# Patient Record
Sex: FEMALE | Race: WHITE
[De-identification: names, ages, dates, MRNs, and addresses within clinical notes are randomized per-mention and may not be internally consistent; named-entity substitution may affect disease eponyms.]

---

## 2018-04-16 ENCOUNTER — HOSPITAL ENCOUNTER (INPATIENT)
Dept: HOSPITAL 68 - SDA | Age: 78
LOS: 3 days | Discharge: NURSING, REHAB OR LONG-TERM CARE | DRG: 470 | End: 2018-04-19
Attending: ORTHOPAEDIC SURGERY | Admitting: ORTHOPAEDIC SURGERY
Payer: COMMERCIAL

## 2018-04-16 VITALS — SYSTOLIC BLOOD PRESSURE: 104 MMHG | DIASTOLIC BLOOD PRESSURE: 52 MMHG

## 2018-04-16 VITALS — SYSTOLIC BLOOD PRESSURE: 115 MMHG | DIASTOLIC BLOOD PRESSURE: 78 MMHG

## 2018-04-16 VITALS — DIASTOLIC BLOOD PRESSURE: 68 MMHG | SYSTOLIC BLOOD PRESSURE: 120 MMHG

## 2018-04-16 VITALS — SYSTOLIC BLOOD PRESSURE: 110 MMHG | DIASTOLIC BLOOD PRESSURE: 68 MMHG

## 2018-04-16 VITALS — DIASTOLIC BLOOD PRESSURE: 68 MMHG | SYSTOLIC BLOOD PRESSURE: 117 MMHG

## 2018-04-16 VITALS — BODY MASS INDEX: 34.81 KG/M2 | WEIGHT: 184.37 LBS | HEIGHT: 61 IN

## 2018-04-16 DIAGNOSIS — E66.9: ICD-10-CM

## 2018-04-16 DIAGNOSIS — M17.11: Primary | ICD-10-CM

## 2018-04-16 DIAGNOSIS — Z91.040: ICD-10-CM

## 2018-04-16 DIAGNOSIS — E78.5: ICD-10-CM

## 2018-04-16 DIAGNOSIS — E03.9: ICD-10-CM

## 2018-04-16 DIAGNOSIS — M81.0: ICD-10-CM

## 2018-04-16 DIAGNOSIS — Z85.3: ICD-10-CM

## 2018-04-16 PROCEDURE — 3E0T3BZ INTRODUCTION OF ANESTHETIC AGENT INTO PERIPHERAL NERVES AND PLEXI, PERCUTANEOUS APPROACH: ICD-10-PCS | Performed by: ORTHOPAEDIC SURGERY

## 2018-04-16 PROCEDURE — 2NBSP: CPT

## 2018-04-16 PROCEDURE — C1713 ANCHOR/SCREW BN/BN,TIS/BN: HCPCS

## 2018-04-16 PROCEDURE — 0SRC0J9 REPLACEMENT OF RIGHT KNEE JOINT WITH SYNTHETIC SUBSTITUTE, CEMENTED, OPEN APPROACH: ICD-10-PCS | Performed by: ORTHOPAEDIC SURGERY

## 2018-04-16 NOTE — SURGICAL DISCHARGE SUMMARY
Visit Information
 
Visit Dates
Admission Date:
04/16/18
 
Discharge Date:
4/19/18
 
 
 
History of Present Illness
Chief Complaint:
Right knee pain related to osteoarthritis
 
Medical History
Blood Transfusion Hx: No
Cardiovascular: hyperlipidemia
Endocrine: hypothyroidism
Cancer(s): breast cancer
History of MRSA: No
History of VRE: No
History of CDIFF: No
Isolation History: Standard
 
Surgical History
Pertinent Surgical History: sp lumpectomy
 
Psychosocial History
Where Do You Live? Home
Who Do You Live With? Patient/Self
Services at Home: None
What is Your Primary Language? English
Review of Systems:
See H&P
 
Hospital Course
 
Course
Attending Physician:
Kandice OSUNA,[facility]
 
Primary Care Physician:
Michael OSUNA,[facility]
 
Hospital Course:
Patient was admitted to the hospital for an elective total joint replacement.  
Procedure was tolerated well and patient was transferred to a general surgical 
floor.  Diet was advanced and tolerated.  Physical therapy performed evaluation 
and treatment.  At time of hospital discharge, vital signs were stable, 
neurovascular status was intact, and pain was controlled with the use of oral 
pain medications. Pt DCed to STR with instructions to FU with Dr Woodson in 2
weeks.
 
Allergies:
Coded Allergies:
adhesive (BLISTER,  04/16/18)
  adhesive that contains latex
 
 
Disposition Summary
 
Disposition
Principal Diagnosis:
Primary osteoarthritis right knee
Additional Diagnosis:
Same, status post right total knee replacement
Discharge Disposition: SNF
 
Discharge Instructions
 
General Discharge Information
Code Status: Full Code
Patient's Diet:
Regular healthy diet
Patient's Activity:
WBAT
Follow-Up Instructions/Appts:
Follow-up in MDs office 2 weeks from surgery.  Call to confirm/schedule 
appointment
 
Medications at Discharge
Discharge Medications:
Continue taking these medications:
Calcium Carbonate/Vitamin D3 (Calcium 500 + D Tablet) 500 MG-400 TABLET
    1 Tablet ORAL TWICE DAILY
 
Gluc/Javi-MSM#1/Vit C/Pb/Bor (Glucosa-Chond-MSM Complex Cplt) 375-500 MG 
TABLET
    
 
Ubidecarenone (Co Q-10) 400 MG CAPSULE
    
 
Letrozole (Femara) 2.5 MG TABLET
    1 Tablet ORAL DAILY
 
Levothyroxine Sodium (Synthroid) 25 MCG TABLET
    1 Tablet ORAL DAILY
 
Psyllium Hydrophylic Muciloid (Metamucil Packet) 3.4 GRAM POWD.PACK
    
 
Simvastatin (Zocor*) 20 MG TABLET
    
 
Start taking the following new medications:
Oxycodone HCl/Acetaminophen (Percocet 5-325 MG Tablet) 5 MG-325 MG TABLET
    1-2 Tablet ORAL EVERY 4-6 HOURS as needed for postop pain
    Qty = 30
    No Refills
 
Celecoxib (Celebrex) 200 MG CAPSULE
    2 Capsule ORAL DAILY
    Qty = 60
    No Refills
 
Apixaban (Eliquis) 2.5 MG TABLET
    1 Tablet ORAL TWICE DAILY
    Qty = 84
    No Refills

## 2018-04-16 NOTE — PN- ORTHOPEDIC
Subjective
Subjective:
POST-OP NOTE:
 
No complaints. Reports pain control significantly better after block done in 
pacu. Currently tolerating clears. No nausea. Eager to try food. Not yet out of 
bed. No dizziness. No shortness of breath. No chest pains.
 
Objective
Vital Signs and I&Os
Vital Signs
 
 
Date Time Temp Pulse Resp B/P B/P Pulse O2 O2 Flow FiO2
 
     Mean Ox Delivery Rate 
 
04/16 1420      99 Nasal 2.0L 
 
       Cannula  
 
04/16 1420 97.9 64 20 120/68  99 Nasal 2.0L 
 
       Cannula  
 
 
 Intake & Output
 
 
 04/16 1600 04/16 0800 04/16 0000 04/15 1600 04/15 0800 04/15 0000
 
Intake Total      
 
Output Total      
 
Balance      
 
       
 
Patient 184 lb     
 
Weight      
 
Weight Bed scale     
 
Measurement      
 
Method      
 
 
 
Physical Exam:
General - alert & oriented x 3. comfortable. no acute distress.
Lungs - clear bilaterally. no w/r/r.
Cardiac - s1s2
Abdomen - soft. nontender.
 - hope draining clear, yellow urine.
Extremities - warm bilaterally. no c/c/e. calves soft and nontender b/l. right 
leg dressing c/d/i. ice pack in place over right knee. on-q in place. nvi. 
athrombics active b/l.
Current Medications:
 Current Medications
 
 
  Sig/Franchesca Start time  Last
 
Medication Dose Route Stop Time Status Admin
 
Acetaminophen 650 MG ONCE 04/16 0000 DC 
 
  PO 04/16 2359  
 
Al Hydroxide/Mg  30 ML Q6P PRN 04/16 1445 AC 
 
Hydroxide  PO   
 
Apixaban 2.5 MG BID 04/17 0900 AC 
 
  PO   
 
Atorvastatin Calcium 10 MG 1700 04/16 1700 AC 
 
  PO   
 
Celecoxib 400 MG DAILY 04/17 0900 AC 
 
  PO   
 
Celecoxib 400 MG ONCE 04/16 0000 DC 
 
  PO 04/16 2359  
 
Dexamethasone 10 MG ONCE 04/16 0000 DC 
 
  IV 04/16 2359  
 
Dextrose/Sodium  1,000 ML Q13H 04/16 1445 AC 
 
Chloride  IV   
 
Docusate Sodium 100 MG DAILY AS NEEDED PRN 04/16 1445 AC 
 
  PO   
 
Gabapentin 300 MG ONCE 04/16 0000 DC 
 
  PO 04/16 2359  
 
Letrozole 2.5 MG DAILY 04/17 0900 AC 
 
  PO   
 
Levothyroxine Sodium 0.025 MG DAILY AC 04/17 0700 AC 
 
  PO   
 
Morphine Sulfate 2 MG Q3P PRN 04/16 1445 AC 
 
  IV   
 
Morphine Sulfate 4 MG Q3P PRN 04/16 1445 AC 
 
  IV   
 
Ondansetron HCl 4 MG Q6P PRN 04/16 1445 AC 
 
  IV   
 
Oxycodone HCl 10 MG ONCE 04/16 0000 DC 
 
  PO 04/16 2359  
 
Oxycodone/ 1 TAB Q4P PRN 04/16 1445 AC 
 
Acetaminophen  PO   
 
Oxycodone/ 2 TAB Q4P PRN 04/16 1445 AC 
 
Acetaminophen  PO   
 
Polyethylene Glycol 17 GM DAILY AS NEEDED PRN 04/16 1445 AC 
 
  PO   
 
Ropivacaine 500 ML ONCE ONE 04/16 1015 DC 
 
ON-Q Ball 1 BAG INJ 04/16 1016  
 
Scopolamine HBr 1 PAT ONCE 04/16 0000 DC 
 
  TOP 04/16 2359  
 
Senna/Docusate Sodium 2 TAB AT BEDTIME AS NEED.. 04/16 1445 AC 
 
  PO   
 
Vancomycin HCl 1,000 MG ONCE ONE 04/16 2200 AC 
 
Dextrose/Water 250 ML IV 04/16 2259  
 
Vancomycin HCl 1,000 MG ONCE 04/16 0000 DC 
 
Dextrose/Water 250 ML IV 04/16 2359  
 
 
 
 
Assessment/Plan
Assessment/Plan
This 77 year old female with hx breast cancer, hypothryoidism, hyperlipidemia, 
constipation, is POD#0 s/p right total knee replacement for primary 
osteoarthritis
 
advance diet as tolerated
pain control as ordered
colace bid / miralax prn
eliquis bid - dvt ppx
celebrex
laura-op vanco
PT eval
d/c hope in am
f/u am labs
home meds ordered
dressing change and removal of On-q POD#2
STR planning, [facility] is her goal
will d/w 
 
 
Core Measures
 
Venous Thromboembolism
VTE Risk Factors Surgery
No Mechanical VTE Prophylaxis d/t N/A MechProphylax Ordered
No VTE Pharm Prophylaxis d/t NA PharmProphylax ordered

## 2018-04-16 NOTE — PATIENT DISCHARGE INSTRUCTIONS
Discharge Instructions
 
General Discharge Information
You were seen/treated for:
Primary osteoarthritis right knee
You had these procedures:
Right total knee replacement
Watch for these problems:
Increasing pain despite the use of pain medication
Increasing redness, warmth or swelling
Drainage of any type from incision
Inability to bear weight on operative leg
Persistent nausea and vomiting
Fever greater than 101.5 degrees
 
Other wound care:
Please keep wound clean and dry.  
No ointments or lotions of any type on or near incision.
Your dressing will be changed by your nurse on the second day after your 
surgery.  Daily dry dressing changes are recommended each day thereafter.  
You may shower 48hr after surgery.  Do not soak your wound- no tub baths or 
swimming. 
 
 
Diet
Continue normal diet: Yes
 
Activity
Activity Limited to: Weight bear as tolerated
Additional ACTIVITY Info:
Use assistive devices as needed
 
Acute Coronary Syndrome
 
Inclusion Criteria
At DC or during hospital stay patient has or had the following:
ACS DIAGNOSIS No
 
Discharge Core Measures
Meds if any: Prescribed or Continued at Discharge
Meds if any: NOT Prescribed or Continued at Discharge
 
Congestive Heart Failure
 
Inclusion Criteria
At DC or during hospital stay patient has or had the following:
CHF DIAGNOSIS No
 
Discharge Core Measures
Meds if any: Prescribed or Continued at Discharge
Meds if any: NOT Prescribed or Continued at Discharge
 
Cerebrovascular accident
 
Inclusion Criteria
At DC or during hospital stay patient has or had the following:
CVA/TIA Diagnosis No
 
Discharge Core Measures
Meds if any: Prescribed or Continued at Discharge
Meds if any: NOT Prescribed or Continued at Discharge
 
Venous thromboembolism
 
Inclusion Criteria
VTE Diagnosis No
VTE Type NONE
VTE Confirmed by (Test) NONE
 
Discharge Core Measures
- Per Current guidelines, there needs to be overlap
- treatment for the first 5 days of Warfarin therapy.
- If discharged on Warfarin prior to 5 days of
- overlap therapy, the patient will need to be
- assessed for post discharge needs including
- *Post discharge parental anticoagulation
- *Warfarin and/or parental anticoagulation education
- *Follow up date to check INR post discharge
At least 5 days overlap therapy as Inpatient No
Meds if any: Prescribed or Continued at Discharge
Note: Overlap Therapy is Warfarin and Anticoagulant
Meds if any: NOT Prescribed or Continued at Discharge

## 2018-04-16 NOTE — ADMISSION CORE MEASURES
Acute Coronary Syndrome (CM)
 
ACS Core Measures
Acute Coronary Syndrome Diagnosis No
 
Congestive Heart Failure (NEW)
 
CHF Core Measures
Congestive Heart Failure Diagnosis No
 
Cerebrovascular Accident (NEW)
 
CVA Core Measures
CVA/TIA Diagnosis No
 
Venous Thromboembolism AUG17
 
VTE Core Frankie (View Protocol)
VTE Risk Factors Surgery
No Mechanical VTE Prophylaxis d/t N/A MechProphylax Ordered
No VTE Pharm Prophylaxis d/t NA PharmProphylax ordered
 
Problem List
 
As ranked by this Provider
includes Assessment & Plan
 1. Primary osteoarthritis of right knee
 
 
HOME MEDS
Home Med List
Calcium Carbonate/Vitamin D3 (Calcium 500 + D Tablet) 500 MG-400 TABLET   1 TAB 
PO BID SUPPLEMENT  (Reported)
Letrozole (Femara) 2.5 MG TABLET   1 TAB PO DAILY BREAST CANCER  (Reported)
Levothyroxine Sodium (Synthroid) 25 MCG TABLET   1 TAB PO DAILY HORMONE  (
Reported)

## 2018-04-17 VITALS — SYSTOLIC BLOOD PRESSURE: 118 MMHG | DIASTOLIC BLOOD PRESSURE: 72 MMHG

## 2018-04-17 VITALS — SYSTOLIC BLOOD PRESSURE: 101 MMHG | DIASTOLIC BLOOD PRESSURE: 65 MMHG

## 2018-04-17 VITALS — SYSTOLIC BLOOD PRESSURE: 110 MMHG | DIASTOLIC BLOOD PRESSURE: 70 MMHG

## 2018-04-17 VITALS — DIASTOLIC BLOOD PRESSURE: 60 MMHG | SYSTOLIC BLOOD PRESSURE: 96 MMHG

## 2018-04-17 VITALS — DIASTOLIC BLOOD PRESSURE: 76 MMHG | SYSTOLIC BLOOD PRESSURE: 124 MMHG

## 2018-04-17 VITALS — SYSTOLIC BLOOD PRESSURE: 108 MMHG | DIASTOLIC BLOOD PRESSURE: 64 MMHG

## 2018-04-17 LAB
ABSOLUTE GRANULOCYTE CT: 11 /CUMM (ref 1.4–6.5)
BASOPHILS # BLD: 0 /CUMM (ref 0–0.2)
BASOPHILS NFR BLD: 0 % (ref 0–2)
EOSINOPHIL # BLD: 0 /CUMM (ref 0–0.7)
EOSINOPHIL NFR BLD: 0 % (ref 0–5)
ERYTHROCYTE [DISTWIDTH] IN BLOOD BY AUTOMATED COUNT: 15 % (ref 11.5–14.5)
GRANULOCYTES NFR BLD: 85 % (ref 42.2–75.2)
HCT VFR BLD CALC: 31.3 % (ref 37–47)
LYMPHOCYTES # BLD: 1 /CUMM (ref 1.2–3.4)
MCH RBC QN AUTO: 30.1 PG (ref 27–31)
MCHC RBC AUTO-ENTMCNC: 33.7 G/DL (ref 33–37)
MCV RBC AUTO: 89.5 FL (ref 81–99)
MONOCYTES # BLD: 0.9 /CUMM (ref 0.1–0.6)
PLATELET # BLD: 161 /CUMM (ref 130–400)
PMV BLD AUTO: 9.2 FL (ref 7.4–10.4)
RED BLOOD CELL CT: 3.5 /CUMM (ref 4.2–5.4)
WBC # BLD AUTO: 13 /CUMM (ref 4.8–10.8)

## 2018-04-17 NOTE — PN- ORTHOPEDIC
**See Addendum**
Subjective
Subjective:
Reports did not sleep well. She may be interested in trying melatonin or 
benadryl tonight if offered. Reports pain relatively controlled. Not yet up with
PT today. She denies dizziness. No shortness of breath. No chest pains. 
Tolerating diet. Anticipates discharge to rehab on thursday.
 
Objective
Vital Signs and I&Os
Vital Signs
 
 
Date Time Temp Pulse Resp B/P B/P Pulse O2 O2 Flow FiO2
 
     Mean Ox Delivery Rate 
 
04/17 0802 97.7 57 20 118/72  94 Room Air  
 
04/17 0410 97.5 65 18 96/60  94 Room Air  
 
04/17 0015 97.8 67 18 101/65  95 Room Air  
 
04/16 2005 97.8 63 18 117/68  99 Room Air  
 
04/16 1830 97.4 64 18 110/68  98 Room Air  
 
04/16 1620 97.4 54 18 115/78  91 Room Air  
 
04/16 1420      99 Nasal 2.0L 
 
       Cannula  
 
04/16 1420 97.9 64 20 120/68  99 Nasal 2.0L 
 
       Cannula  
 
 
 Intake & Output
 
 
 04/17 1600 04/17 0800 04/17 0000 04/16 1600 04/16 0800 04/16 0000
 
Intake Total  1080 1125   
 
Output Total  1600 1500   
 
Balance  -520 -375   
 
       
 
Intake, IV  600    
 
Intake, Oral  480 1125   
 
Output, Urine  1600 1500   
 
Patient    184 lb  
 
Weight      
 
Weight    Bed scale  
 
Measurement      
 
Method      
 
 
 
Physical Exam:
General - alert & oriented x 3. comfortable. no acute distress.
Lungs - clear bilaterally. no w/r/r.
Cardiac - s1s2. 
Abdomen - soft. nontender.
 - hope draining clear, yellow urine. right leg dressing c/d/i. on q in 
place. athrombics active b/l. no calf tenderness bilaterally. nvi.
Current Medications:
 Current Medications
 
 
  Sig/Franchesca Start time  Last
 
Medication Dose Route Stop Time Status Admin
 
Acetaminophen 650 MG ONCE 04/16 0000 DC 
 
  PO 04/16 2359  
 
Al Hydroxide/Mg  30 ML Q6P PRN 04/16 1445 AC 
 
Hydroxide  PO   
 
Apixaban 2.5 MG BID 04/17 0900 AC 
 
  PO   
 
Atorvastatin Calcium 10 MG 1700 04/16 1700 AC 04/16
 
  PO   1635
 
Celecoxib 400 MG DAILY 04/17 0900 AC 
 
  PO   
 
Celecoxib 400 MG ONCE 04/16 0000 DC 
 
  PO 04/16 2359  
 
Dexamethasone 10 MG ONCE 04/16 0000 DC 
 
  IV 04/16 2359  
 
Dextrose/Sodium  1,000 ML Q13H 04/16 1445 AC 04/17
 
Chloride  IV   0201
 
Docusate Sodium 100 MG BID 04/16 2100 AC 04/16
 
  PO   2157
 
Docusate Sodium 100 MG DAILY AS NEEDED PRN 04/16 1445 DC 
 
  PO   
 
Fentanyl Citrate 250 MCG .STK-MED ONE 04/16 1057 DC 
 
  IM 04/16 1058  
 
Fentanyl Citrate 100 MCG .STK-MED ONE 04/16 0929 DC 
 
  IM 04/16 0930  
 
Gabapentin 300 MG ONCE 04/16 0000 DC 
 
  PO 04/16 2359  
 
Letrozole 2.5 MG DAILY 04/17 0900 AC 
 
  PO   
 
Levothyroxine Sodium 0.025 MG DAILY AC 04/17 0700 AC 04/17
 
  PO   0519
 
Midazolam HCl 4 MG .STK-MED ONE 04/16 0929 DC 
 
  IM 04/16 0930  
 
Morphine Sulfate 2 MG Q3P PRN 04/16 1445 AC 
 
  IV   
 
Morphine Sulfate 4 MG Q3P PRN 04/16 1445 AC 
 
  IV   
 
Morphine Sulfate 10 MG .STK-MED ONE 04/16 0930 DC 
 
  IV 04/16 0931  
 
Ondansetron HCl 4 MG Q6P PRN 04/16 1445 AC 
 
  IV   
 
Oxycodone HCl 10 MG ONCE 04/16 0000 DC 
 
  PO 04/16 2359  
 
Oxycodone/ 1 TAB Q4P PRN 04/16 1445 AC 
 
Acetaminophen  PO   
 
Oxycodone/ 2 TAB Q4P PRN 04/16 1445 AC 
 
Acetaminophen  PO   
 
Polyethylene Glycol 17 GM DAILY AS NEEDED PRN 04/16 1445 AC 
 
  PO   
 
Ropivacaine 500 ML ONCE ONE 04/16 1015 DC 
 
ON-Q Ball 1 BAG INJ 04/16 1016  
 
Scopolamine HBr 1 PAT ONCE 04/16 0000 DC 
 
  TOP 04/16 2359  
 
Senna/Docusate Sodium 2 TAB AT BEDTIME AS NEED.. 04/16 1445 AC 
 
  PO   
 
Tranexamic Acid 2,000 MG .STK-MED ONE 04/16 0929 DC 
 
  IV 04/16 0930  
 
Vancomycin HCl 1,000 MG ONCE ONE 04/16 2200 DC 04/16
 
Dextrose/Water 250 ML IV 04/16 2259  2157
 
Vancomycin HCl 1,000 MG ONCE 04/16 0000 DC 
 
Dextrose/Water 250 ML IV 04/16 2359  
 
 
 
 
Results
Last 48 Hours of Labs:
 Laboratory Tests
 
 
 04/17
 
 0750
 
Chemistry 
 
  Sodium Pending
 
  Potassium Pending
 
  Chloride Pending
 
  Carbon Dioxide Pending
 
  Anion Gap Pending
 
  BUN Pending
 
  Creatinine Pending
 
  BUN/Creatinine Ratio Pending
 
Hematology 
 
  CBC w Diff Pending
 
  WBC Pending
 
  RBC Pending
 
  Hgb Pending
 
  Hct Pending
 
  MCV Pending
 
  MCH Pending
 
  MCHC Pending
 
  RDW Pending
 
  Plt Count Pending
 
  MPV Pending
 
 
 
 
Assessment/Plan
Assessment/Plan
This 77 year old female with hx breast cancer, hypothryoidism, hyperlipidemia, 
constipation, is POD#1 s/p right total knee replacement for primary 
osteoarthritis
 
tolerating diet. d/c iv fluids
pain controlled
colace bid / miralax prn
eliquis bid - dvt ppx
celebrex
laura-op vanco complete
PT eval
d/c hope
f/u labs
home meds ordered
dressing change and removal of On-q POD#2
STR planning, [facility] is her goal
will d/w 
 
 
 
Core Measures
 
Venous Thromboembolism
VTE Risk Factors Surgery
No Mechanical VTE Prophylaxis d/t N/A MechProphylax Ordered
No VTE Pharm Prophylaxis d/t NA PharmProphylax ordered

## 2018-04-17 NOTE — OPERATIVE REPORT
Operative/Inv Procedure Report
Surgery Date: 04/16/18
Name of Procedure:
Right total knee arthroplasty
Pre-Operative Diagnosis:
Primary right knee osteoarthritis
Post-Operative Diagnosis:
Same
Estimated Blood Loss: less than 50ml
Surgeon/Assistant:
Kandice OSUNA,Jarvis Almazan
 
Anesthesia: general endotracheal tube
Implants:
Norman triathlon total knee system-size 3 femur, size 3 tibia, 9 mm cruciate 
retaining polyethylene, 27 patella
Drains:
None
Specimens:
Femoral, tibial, patellar bone, meniscal remnants
Microbiology:
Urine
Tourniquet:
56 minutes
Complications:
None
Condition:
Stable
Operative Indication:
Patient is a 77-year-old woman who has a long history of bilateral knee pain.  
Symptoms are aggravated with activity and activity of daily living compromised 
by her right knee pain.  She underwent previous treatment for knee with only 
short-term relief.  She was evaluated in our office and wanted to proceed with 
total knee arthroplasty.  Risks, benefits and expectations were discussed which 
included but were not limited to persistent knee pain, need for subsequent 
surgery, infection, stiffness,  injury to blood vessel or nerve, DVT anesthesia 
risks.  She wished to proceed with total knee arthroplasty.  Preoperatively 
patient had a 15 flexion contracture and this would need to be addressed during
the case
 
Operative/Procedure Note
Note:
Patient was brought to the operating room and transferred to the operating 
table.  Once under appropriate anesthesia the right lower extremity was prepped 
and draped in standard fashion.  There was an attempt to have a spinal 
anesthesia done but it was not successful and therefore patient went under 
general anesthesia.  Right lower extremity was elevated exsanguinated and 
tourniquet was inflated at 300 mL was a pressure.  A standard anterior shins was
made for anticipated medial parapatellar approach to the knee.  The incision was
taken down sharply to the underlying retinaculum.  A medial retinacular approach
was minimal extension into the quadriceps tendon was done.  Moderate knee 
effusion was evacuated.  Remnants of the anterior and medial and lateral 
meniscal tissues were excised.  Remnants of the ACL was excised.  Osteophytes 
were removed.  The knee was flexed and the patella was subluxed exposing the 
distal femur.  A drill was used for the intramedullary guide for the distal 
femoral cut.  Because of her valgus alignment and flexion contracture, 5 cut 
was chosen with a 10 mm thickness of the distal femur removed.  The femur was 
incised was size 3.  Size 3 cutting block was pinned in place and then the cuts 
were made.  The anterior chamfer bone was set aside for later use as a femoral 
canal plug.  I was satisfied with the preparation of femur.  I then turned my 
attention to the tibia.  The external tibial alignment guide was used to pin the
cutting block in a neutral position from medial to lateral and reproducing 
patient's posterior slow-paced on preoperative templating and intraoperative 
findings.  The cut was made while protecting the soft tissues medial lateral and
posterior retractors.  The PCL was recessed for balance purposes.  The cut was 
made the bone was removed.  The tibia was incised to a size 3.  I use a curved 
osteotome to remove any posterior osteophytes and also to release the posterior 
capsule due to patient's flexion contracture preoperatively.  I then did a trial
with a size 3 tibia size 3 femur and a 9 mm poly-patella.  I then took the knee 
out to full extension and prepared the patella.  The patella was measured and 
the appropriate thickness was removed and then replaced with a size 27 patella. 
The 3 lug holes were drilled.  I took the knee through range of motion.  Patient
did have a tendency to ride laterally in this would be later checked after the 
tourniquet was deflated to determine if she needed a lateral release.  I then 
removed all trial components after marking my rotation of the tibia and drilling
the 2 lug holes for the femur.  I finished preparation of the tibia with the 
appropriate sized tibial punch with the appropriate rotation.  Instruments were 
removed from the knee.  The femoral intramedullary hole was plugged with the 
anterior chamfer bone to minimize postoperative hemarthrosis and swelling 
Copious irrigation was started.  Cement was being mixed on the back table.  Once
the cement was ready was applied to the dry clean bony surfaces of the tibia.  
The definitive size 3 tibial component was impacted in place with excess cement 
being removed with curettes.  Cement was applied to the dry clean bony surfaces 
of the distal femur and the size 3 femoral component was impacted in place and 
excess cement was removed with curettes.  The appropriate polyethylene was 
inserted and the knee was taken out to full extension.  Cement was applied to 
the dry clean bony surfaces of the patella and the size 27 patella was impacted 
in place and excess cement was removed with knife.  The knee was left in 
extension as the cement was hardening.  I did appear articular pericapsular 
injection of a cocktail which included ropivacaine with epinephrine and Toradol 
for postoperative pain and inflammation management.  Once the cement was 
hardening took the knee through range of motion.  I was satisfied with the 9 mm 
cruciate retaining polyethylene.  Soft tissue balancing medial laterally and 
posteriorly.  I then removed the trial component.  Copious irrigation of the 
tibial tray followed.  I made sure there was no further soft tissue, soft tissue
or bony/cement fragments within the tibial tray.  I then impacted the definitive
size 9 mm cruciate retaining polyethylene to place.  The locking mechanism was 
confirmed.  Copious irrigation followed tourniquet was deflated.  At that point 
it was apparent the patient would need a lateral release.  This was completed to
maximize patellofemoral tracking.  I was satisfied with the tracking after the 
release was completed.  Hemostasis was obtained..  Every level of closure was 
followed by copious irrigation.  The medial retinacular approach was closed with
interrupted #1 Vicryl sutures.  Subcutaneous tissues closed in 2 layers with 2-0
Vicryl and skin was closed with a running 3-0 Vicryl suture with the knee in 
flexion.  Appropriate dressings were applied and patient was awakened and taken 
the recovery room in good condition.  No intraoperative complications
Discharge Disposition: PACU

## 2018-04-18 VITALS — DIASTOLIC BLOOD PRESSURE: 82 MMHG | SYSTOLIC BLOOD PRESSURE: 138 MMHG

## 2018-04-18 VITALS — DIASTOLIC BLOOD PRESSURE: 80 MMHG | SYSTOLIC BLOOD PRESSURE: 124 MMHG

## 2018-04-18 VITALS — SYSTOLIC BLOOD PRESSURE: 100 MMHG | DIASTOLIC BLOOD PRESSURE: 64 MMHG

## 2018-04-18 NOTE — RADIOLOGY REPORT
EXAMINATION:
XR KNEE, RIGHT
 
CLINICAL INFORMATION:
Status post right knee replacement.
 
COMPARISON:
None
 
TECHNIQUE:
Two views of the right knee.
 
FINDINGS:
The patient is status post total right hip arthroplasty. Prosthetic
components are well-seated the native bone and anatomically aligned.
Postoperative changes as seen in the overlying soft tissues with mild soft
tissue edema and subcutaneous emphysema seen. Moderate suprapatellar knee
joint effusion is noted. Diffuse osteopenia is seen.
 
IMPRESSION:
1.  Anatomic alignment status post total right knee arthroplasty with no
hardware failure or native bone fracture seen.
2.  Moderate size suprapatellar knee joint effusion.
3.  Other cutaneous soft tissue edema and emphysema are consistent with
postoperative state.

## 2018-04-18 NOTE — PN- ORTHOPEDIC
**See Addendum**
Subjective
Subjective:
pain overnihgt at knee, no n/v/cp/sob.  no other compaints. desires dc to str as
she lives alone and has many stairs
 
Objective
Vital Signs and I&Os
Vital Signs
 
 
Date Time Temp Pulse Resp B/P B/P Pulse O2 O2 Flow FiO2
 
     Mean Ox Delivery Rate 
 
04/18 0622 98.3 67 20 138/82  95 Room Air  
 
04/17 2226 98.1 79 18 124/76  98 Room Air  
 
04/17 1610 97.6 70 20 108/64  100 Room Air  
 
04/17 1345       Room Air  
 
04/17 1226 97.7 68 18 110/70  99 Room Air  
 
 
 Intake & Output
 
 
 04/18 1600 04/18 0800 04/18 0000 04/17 1600 04/17 0800 04/17 0000
 
Intake Total  120 6752 199 3877 1125
 
Output Total  400 2400 1100 1600 1500
 
Balance  -280 -900 -125 -520 -375
 
       
 
Intake, IV    75 600 
 
Intake, Oral  120 1500 
 
Number    0  
 
Bowel      
 
Movements      
 
Output, Urine  400 2400 1100 1600 1500
 
 
 
Physical Exam:
gen- nad, sitting in chair
card-s1s2
pulm-ctab
ext- right knee dressed, ace taken down- incision w edema, ttp, cdi.  redressed,
strong palp dp, gross sensate/motor intact. calves soft nt bl. 
 
Assessment/Plan
Assessment/Plan
A- POD2 sp R TKR, stable with appropriate postop pain
 
P-
prn po pain meds
oob, ambualte, pt, wbat
home meds
eliquis bid
i&Os
ist
dc planning
will dw attending
 
 
Core Measures
 
Venous Thromboembolism
VTE Risk Factors Surgery
No Mechanical VTE Prophylaxis d/t N/A MechProphylax Ordered
No VTE Pharm Prophylaxis d/t NA PharmProphylax ordered

## 2018-04-19 VITALS — SYSTOLIC BLOOD PRESSURE: 116 MMHG | DIASTOLIC BLOOD PRESSURE: 58 MMHG

## 2018-04-19 VITALS — DIASTOLIC BLOOD PRESSURE: 58 MMHG | SYSTOLIC BLOOD PRESSURE: 116 MMHG

## 2018-04-19 NOTE — PN- ORTHOPEDIC
Subjective
Subjective:
pt in bed, pain well controlled. tolerating PO, voiding, BM last night
Deneis paresthesias, CP/SOB, fevers
 
Objective
Vital Signs and I&Os
Vital Signs
 
 
Date Time Temp Pulse Resp B/P B/P Pulse O2 O2 Flow FiO2
 
     Mean Ox Delivery Rate 
 
04/19 0709 98.0 72 18 116/58  92   
 
04/18 2148 98.1 88 18 100/64  96 Room Air  
 
04/18 1433 98.5 72 18 124/80  97 Room Air  
 
 
 Intake & Output
 
 
 04/19 1600 04/19 0800 04/19 0000 04/18 1600 04/18 0800 04/18 0000
 
Intake Total  420 950 
 
Output Total    9020 043 9483
 
Balance  420 950 -640 -280 -900
 
       
 
Intake, IV    10  
 
Intake, Oral  420 950 
 
Number   3 0  
 
Bowel      
 
Movements      
 
Output, Urine    [phone]
 
 
 
Physical Exam:
gen- NAD
resp-clear
cardiac- RRR
abd- soft, NT
ext- right knee dressing changed, incision clean and try, mild swelling, no 
drainage or signs of infection. distal sensory and motor function intact. no 
calf tenderness.
Current Medications:
 Current Medications
 
 
  Sig/Franchesca Start time  Last
 
Medication Dose Route Stop Time Status Admin
 
Al Hydroxide/Mg  30 ML Q6P PRN 04/16 1445 AC 
 
Hydroxide  PO   
 
Apixaban 2.5 MG BID 04/17 0900 AC 04/18
 
  PO   2050
 
Atorvastatin Calcium 10 MG 1700 04/16 1700 AC 04/18
 
  PO   1812
 
Celecoxib 400 MG DAILY 04/17 0900 AC 04/18
 
  PO   1025
 
Diphenhydramine HCl 25 MG AT BEDTIME AS NEED.. 04/17 0845 AC 
 
  IV   
 
Docusate Sodium 100 MG BID 04/16 2100 AC 04/18
 
  PO   1025
 
Letrozole 2.5 MG DAILY 04/17 0900 AC 04/18
 
  PO   1025
 
Levothyroxine Sodium 0.025 MG DAILY AC 04/17 0700 AC 04/19
 
  PO   0525
 
Magnesium Hydroxide 30 ML AT BEDTIME 04/18 2100 AC 
 
  PO   
 
Melatonin 5 MG AT BEDTIME AS NEED.. 04/17 0845 AC 
 
  PO   
 
Morphine Sulfate 2 MG Q3P PRN 04/16 1445 AC 
 
  IV   
 
Morphine Sulfate 4 MG Q3P PRN 04/16 1445 AC 
 
  IV   
 
Ondansetron HCl 4 MG Q6P PRN 04/16 1445 AC 
 
  IV   
 
Oxycodone/ 1 TAB Q4P PRN 04/16 1445 AC 
 
Acetaminophen  PO   
 
Oxycodone/ 2 TAB Q4P PRN 04/16 1445 AC 04/19
 
Acetaminophen  PO   0354
 
Polyethylene Glycol 17 GM DAILY AS NEEDED PRN 04/16 1445 AC 04/18
 
  PO   1025
 
Senna/Docusate Sodium 2 TAB AT BEDTIME AS NEED.. 04/16 1445 AC 04/18
 
  PO   1025
 
 
 
 
Assessment/Plan
Assessment/Plan
A- POD3 sp R TKR, stable with appropriate postop pain
 
P-
prn po pain meds
oob, ambualte, pt, wbat
home meds
eliquis bid to cont for 6 weeks
ist
dc planning- plan to go to str
 
 
 
Core Measures
 
Venous Thromboembolism
VTE Risk Factors Surgery
No Mechanical VTE Prophylaxis d/t N/A MechProphylax Ordered
No VTE Pharm Prophylaxis d/t NA PharmProphylax ordered